# Patient Record
Sex: MALE | Race: WHITE | NOT HISPANIC OR LATINO | URBAN - METROPOLITAN AREA
[De-identification: names, ages, dates, MRNs, and addresses within clinical notes are randomized per-mention and may not be internally consistent; named-entity substitution may affect disease eponyms.]

---

## 2021-01-18 ENCOUNTER — OUTSIDE FACILITY SERVICE (OUTPATIENT)
Dept: CARDIOLOGY | Facility: CLINIC | Age: 36
End: 2021-01-18

## 2021-01-18 PROCEDURE — 93306 TTE W/DOPPLER COMPLETE: CPT | Performed by: INTERNAL MEDICINE

## 2023-05-15 ENCOUNTER — OFFICE VISIT (OUTPATIENT)
Dept: FAMILY MEDICINE CLINIC | Facility: CLINIC | Age: 38
End: 2023-05-15
Payer: COMMERCIAL

## 2023-05-15 VITALS
HEART RATE: 62 BPM | BODY MASS INDEX: 26.48 KG/M2 | WEIGHT: 178.8 LBS | HEIGHT: 69 IN | RESPIRATION RATE: 18 BRPM | TEMPERATURE: 97.1 F | DIASTOLIC BLOOD PRESSURE: 86 MMHG | SYSTOLIC BLOOD PRESSURE: 118 MMHG | OXYGEN SATURATION: 97 %

## 2023-05-15 DIAGNOSIS — R07.9 CHEST PAIN, UNSPECIFIED TYPE: ICD-10-CM

## 2023-05-15 DIAGNOSIS — Z76.89 ENCOUNTER TO ESTABLISH CARE: ICD-10-CM

## 2023-05-15 DIAGNOSIS — Z00.00 ANNUAL PHYSICAL EXAM: Primary | ICD-10-CM

## 2023-05-15 LAB
ALBUMIN SERPL-MCNC: 4.6 G/DL (ref 3.5–5.2)
ALBUMIN/GLOB SERPL: 1.6 G/DL
ALP SERPL-CCNC: 88 U/L (ref 39–117)
ALT SERPL W P-5'-P-CCNC: 18 U/L (ref 1–41)
ANION GAP SERPL CALCULATED.3IONS-SCNC: 12.8 MMOL/L (ref 5–15)
AST SERPL-CCNC: 24 U/L (ref 1–40)
BASOPHILS # BLD AUTO: 0.07 10*3/MM3 (ref 0–0.2)
BASOPHILS NFR BLD AUTO: 1.2 % (ref 0–1.5)
BILIRUB SERPL-MCNC: 0.4 MG/DL (ref 0–1.2)
BUN SERPL-MCNC: 15 MG/DL (ref 6–20)
BUN/CREAT SERPL: 16.5 (ref 7–25)
CALCIUM SPEC-SCNC: 9.8 MG/DL (ref 8.6–10.5)
CHLORIDE SERPL-SCNC: 104 MMOL/L (ref 98–107)
CHOLEST SERPL-MCNC: 173 MG/DL (ref 0–200)
CO2 SERPL-SCNC: 23.2 MMOL/L (ref 22–29)
CREAT SERPL-MCNC: 0.91 MG/DL (ref 0.76–1.27)
DEPRECATED RDW RBC AUTO: 40.6 FL (ref 37–54)
EGFRCR SERPLBLD CKD-EPI 2021: 111.3 ML/MIN/1.73
EOSINOPHIL # BLD AUTO: 0.32 10*3/MM3 (ref 0–0.4)
EOSINOPHIL NFR BLD AUTO: 5.3 % (ref 0.3–6.2)
ERYTHROCYTE [DISTWIDTH] IN BLOOD BY AUTOMATED COUNT: 11.9 % (ref 12.3–15.4)
GLOBULIN UR ELPH-MCNC: 2.8 GM/DL
GLUCOSE SERPL-MCNC: 97 MG/DL (ref 65–99)
HCT VFR BLD AUTO: 49.9 % (ref 37.5–51)
HCV AB SER DONR QL: NORMAL
HDLC SERPL-MCNC: 40 MG/DL (ref 40–60)
HGB BLD-MCNC: 16.9 G/DL (ref 13–17.7)
IMM GRANULOCYTES # BLD AUTO: 0.02 10*3/MM3 (ref 0–0.05)
IMM GRANULOCYTES NFR BLD AUTO: 0.3 % (ref 0–0.5)
LDLC SERPL CALC-MCNC: 119 MG/DL (ref 0–100)
LDLC/HDLC SERPL: 2.96 {RATIO}
LYMPHOCYTES # BLD AUTO: 1.77 10*3/MM3 (ref 0.7–3.1)
LYMPHOCYTES NFR BLD AUTO: 29.5 % (ref 19.6–45.3)
MCH RBC QN AUTO: 31.2 PG (ref 26.6–33)
MCHC RBC AUTO-ENTMCNC: 33.9 G/DL (ref 31.5–35.7)
MCV RBC AUTO: 92.1 FL (ref 79–97)
MONOCYTES # BLD AUTO: 0.37 10*3/MM3 (ref 0.1–0.9)
MONOCYTES NFR BLD AUTO: 6.2 % (ref 5–12)
NEUTROPHILS NFR BLD AUTO: 3.46 10*3/MM3 (ref 1.7–7)
NEUTROPHILS NFR BLD AUTO: 57.5 % (ref 42.7–76)
NRBC BLD AUTO-RTO: 0 /100 WBC (ref 0–0.2)
PLATELET # BLD AUTO: 297 10*3/MM3 (ref 140–450)
PMV BLD AUTO: 9.4 FL (ref 6–12)
POTASSIUM SERPL-SCNC: 4.4 MMOL/L (ref 3.5–5.2)
PROT SERPL-MCNC: 7.4 G/DL (ref 6–8.5)
RBC # BLD AUTO: 5.42 10*6/MM3 (ref 4.14–5.8)
SODIUM SERPL-SCNC: 140 MMOL/L (ref 136–145)
TRIGL SERPL-MCNC: 73 MG/DL (ref 0–150)
TSH SERPL DL<=0.05 MIU/L-ACNC: 2.1 UIU/ML (ref 0.27–4.2)
VLDLC SERPL-MCNC: 14 MG/DL (ref 5–40)
WBC NRBC COR # BLD: 6.01 10*3/MM3 (ref 3.4–10.8)

## 2023-05-15 PROCEDURE — 84443 ASSAY THYROID STIM HORMONE: CPT | Performed by: NURSE PRACTITIONER

## 2023-05-15 PROCEDURE — 86803 HEPATITIS C AB TEST: CPT | Performed by: NURSE PRACTITIONER

## 2023-05-15 PROCEDURE — 80061 LIPID PANEL: CPT | Performed by: NURSE PRACTITIONER

## 2023-05-15 PROCEDURE — 85025 COMPLETE CBC W/AUTO DIFF WBC: CPT | Performed by: NURSE PRACTITIONER

## 2023-05-15 PROCEDURE — 80053 COMPREHEN METABOLIC PANEL: CPT | Performed by: NURSE PRACTITIONER

## 2023-05-15 NOTE — PROGRESS NOTES
"Chief Complaint  Establish Care (Annual physical (continued care of chest pain).)    Subjective        Yung Montiel presents to [facility] to establish care (annual physical).    Chest Pain   This is a recurrent problem. Episode onset: couple years ago. The onset quality is sudden. The problem occurs intermittently. The problem has been waxing and waning. Pain location: left anterior chest area. The pain is at a severity of 2/10 (2 most of the time; up to a 10 with exertion). The quality of the pain is described as sharp. The pain radiates to the left arm. Associated symptoms include diaphoresis, dizziness, exertional chest pressure, headaches, malaise/fatigue, palpitations and shortness of breath. Pertinent negatives include no abdominal pain, back pain, claudication, cough, fever, hemoptysis, irregular heartbeat, leg pain, lower extremity edema, nausea, near-syncope, numbness, orthopnea, PND, sputum production, syncope, vomiting or weakness. Risk factors include smoking/tobacco exposure and male gender.   His family medical history is significant for hypertension and stroke.   Pertinent negatives for family medical history include: no diabetes and no hyperlipidemia. Prior workup: pt has been to ER 2 times; both times MI ruled out.     Objective   Vital Signs:  /86 (BP Location: Right arm, Patient Position: Sitting, Cuff Size: Adult)   Pulse 62   Temp 97.1 °F (36.2 °C) (Temporal)   Resp 18   Ht 175.3 cm (69\")   Wt 81.1 kg (178 lb 12.8 oz)   SpO2 97%   BMI 26.40 kg/m²   Estimated body mass index is 26.4 kg/m² as calculated from the following:    Height as of this encounter: 175.3 cm (69\").    Weight as of this encounter: 81.1 kg (178 lb 12.8 oz).       BMI is >= 25 and <30. (Overweight) The following options were offered after discussion;: weight loss educational material (shared in after visit summary)      Physical Exam  Vitals and nursing note reviewed. Exam " conducted with a chaperone present.   Constitutional:       General: He is awake.      Appearance: Normal appearance.   HENT:      Head: Normocephalic.      Right Ear: Hearing, tympanic membrane, ear canal and external ear normal.      Left Ear: Hearing, tympanic membrane, ear canal and external ear normal.      Nose: Nose normal.      Mouth/Throat:      Lips: Pink.      Mouth: Mucous membranes are moist.      Pharynx: Oropharynx is clear.   Eyes:      General: Lids are normal.      Extraocular Movements: Extraocular movements intact.      Conjunctiva/sclera: Conjunctivae normal.   Cardiovascular:      Rate and Rhythm: Normal rate and regular rhythm.      Pulses: Normal pulses.      Heart sounds: Normal heart sounds.   Pulmonary:      Effort: Pulmonary effort is normal.      Breath sounds: Normal breath sounds.   Abdominal:      General: Abdomen is protuberant. Bowel sounds are normal.      Palpations: Abdomen is soft.      Tenderness: There is no abdominal tenderness.   Musculoskeletal:      Cervical back: Normal range of motion and neck supple.      Right lower leg: No edema.      Left lower leg: No edema.   Skin:     General: Skin is warm and dry.      Capillary Refill: Capillary refill takes less than 2 seconds.   Neurological:      Mental Status: He is alert and oriented to person, place, and time.      Cranial Nerves: Cranial nerves 2-12 are intact.      Sensory: Sensation is intact.      Motor: Motor function is intact.      Coordination: Coordination is intact.      Gait: Gait is intact.      Deep Tendon Reflexes: Reflexes are normal and symmetric.   Psychiatric:         Attention and Perception: Attention and perception normal.         Mood and Affect: Affect normal. Mood is anxious.         Speech: Speech normal.         Behavior: Behavior normal. Behavior is cooperative.         Thought Content: Thought content normal.         Cognition and Memory: Cognition normal.         Judgment: Judgment normal.           Result Review :  The following data was reviewed by: SARITA Avila on 05/15/2023:                Pt just had CXR and EKG at [facility]. States all was negative. Pt refuses necessity to repeat these tests at this time.  Will request medical records.    Assessment and Plan   Diagnoses and all orders for this visit:    1. Annual physical exam (Primary)  -     Tdap Vaccine Greater Than or Equal To 6yo IM  -     CBC w AUTO Differential; Future  -     Comprehensive metabolic panel; Future  -     Lipid panel; Future  -     TSH; Future  -     Hepatitis C antibody; Future  -     Hepatitis C antibody  -     TSH  -     Lipid panel  -     Comprehensive metabolic panel  -     CBC w AUTO Differential    2. Encounter to establish care    3. Chest pain, unspecified type  -     Comprehensive metabolic panel; Future  -     Lipid panel; Future  -     Ambulatory Referral to Cardiology  -     Lipid panel  -     Comprehensive metabolic panel         The preventive exam has been reviewed in detail.  The patient has been fully counseled on preventative guidelines for vaccines, cancer screenings, and other health maintenance needs.   The patient has been counseled on guidelines for maintaining a lifestyle to promote good health and to minimize chronic diseases.  The patient has been assisted with scheduling these healthcare procedures for the coming year and given a written document of health maintenance and anticipatory guidance for age with the AVS.    I spent 30 minutes caring for Yung on this date of service. This time includes time spent by me in the following activities:preparing for the visit, reviewing tests, obtaining and/or reviewing a separately obtained history, performing a medically appropriate examination and/or evaluation , counseling and educating the patient/family/caregiver, ordering medications, tests, or procedures, referring and communicating with other health care professionals , documenting  information in the medical record, independently interpreting results and communicating that information with the patient/family/caregiver and care coordination     Follow Up   Return in about 1 year (around 5/15/2024) for Annual physical.  Patient was given instructions and counseling regarding his condition or for health maintenance advice. Please see specific information pulled into the AVS if appropriate.       This document has been electronically signed by SARITA Avila  May 15, 2023 10:55 EDT

## 2023-05-16 NOTE — PROGRESS NOTES
Results discussed with pt. Pt educated regarding AHA lifestyle recommendations to improve fasting lipid levels. Pt verbalizes understanding.

## 2023-05-19 ENCOUNTER — PATIENT ROUNDING (BHMG ONLY) (OUTPATIENT)
Dept: FAMILY MEDICINE CLINIC | Facility: CLINIC | Age: 38
End: 2023-05-19
Payer: COMMERCIAL

## 2023-05-19 NOTE — PROGRESS NOTES
May 19, 2023    Hello, may I speak with Yung Montiel?    My name is Jaya Barragan    I am  with [facility]  [address] [address] [phone].    Before we get started may I verify your date of birth? 1985    I am calling to officially welcome you to our practice and ask about your recent visit. Is this a good time to talk?     YES    Tell me about your visit with us. What things went well?      Patient said his wait time was between 10 and 15 minutes. He said he didn't have any issues with the staff and this visit went better than some visits he has had at other offices.        We're always looking for ways to make our patients' experiences even better. Do you have recommendations on ways we may improve?      No, the visit was excellent.    Overall were you satisfied with your first visit to our practice?     YES       I appreciate you taking the time to speak with me today. Is there anything else I can do for you?     NO      Thank you, and have a great day.

## 2023-05-25 ENCOUNTER — OFFICE VISIT (OUTPATIENT)
Dept: CARDIOLOGY | Facility: CLINIC | Age: 38
End: 2023-05-25
Payer: COMMERCIAL

## 2023-05-25 VITALS
HEIGHT: 69 IN | DIASTOLIC BLOOD PRESSURE: 100 MMHG | BODY MASS INDEX: 26.66 KG/M2 | SYSTOLIC BLOOD PRESSURE: 140 MMHG | HEART RATE: 77 BPM | WEIGHT: 180 LBS

## 2023-05-25 DIAGNOSIS — E78.00 HYPERCHOLESTEREMIA: ICD-10-CM

## 2023-05-25 DIAGNOSIS — R07.2 PRECORDIAL PAIN: Primary | ICD-10-CM

## 2023-05-25 DIAGNOSIS — R00.2 PALPITATIONS: ICD-10-CM

## 2023-05-25 DIAGNOSIS — Z72.0 TOBACCO USE: ICD-10-CM

## 2023-05-25 DIAGNOSIS — I10 PRIMARY HYPERTENSION: ICD-10-CM

## 2023-05-25 DIAGNOSIS — R06.02 SHORTNESS OF BREATH: ICD-10-CM

## 2023-05-25 PROCEDURE — 3080F DIAST BP >= 90 MM HG: CPT | Performed by: INTERNAL MEDICINE

## 2023-05-25 PROCEDURE — 99204 OFFICE O/P NEW MOD 45 MIN: CPT | Performed by: INTERNAL MEDICINE

## 2023-05-25 PROCEDURE — 93000 ELECTROCARDIOGRAM COMPLETE: CPT | Performed by: INTERNAL MEDICINE

## 2023-05-25 PROCEDURE — 3077F SYST BP >= 140 MM HG: CPT | Performed by: INTERNAL MEDICINE

## 2023-05-25 PROCEDURE — 1160F RVW MEDS BY RX/DR IN RCRD: CPT | Performed by: INTERNAL MEDICINE

## 2023-05-25 PROCEDURE — 1159F MED LIST DOCD IN RCRD: CPT | Performed by: INTERNAL MEDICINE

## 2023-05-25 RX ORDER — METOPROLOL SUCCINATE 25 MG/1
25 TABLET, EXTENDED RELEASE ORAL DAILY
Qty: 30 TABLET | Refills: 11 | Status: SHIPPED | OUTPATIENT
Start: 2023-05-25

## 2023-05-25 RX ORDER — ROSUVASTATIN CALCIUM 10 MG/1
10 TABLET, COATED ORAL DAILY
Qty: 30 TABLET | Refills: 11 | Status: SHIPPED | OUTPATIENT
Start: 2023-05-25

## 2023-05-25 RX ORDER — PANTOPRAZOLE SODIUM 40 MG/1
40 TABLET, DELAYED RELEASE ORAL DAILY
Qty: 30 TABLET | Refills: 6 | Status: SHIPPED | OUTPATIENT
Start: 2023-05-25

## 2023-05-25 NOTE — PROGRESS NOTES
Yung Montiel  reports that he has never smoked. His smokeless tobacco use includes snuff.. I have educated him on the risk of diseases from using tobacco products such as cancer and heart disease.     I advised him to quit and he is willing to quit. We have discussed the following method/s for tobacco cessation:  Education Material Counseling.  Together we have set a quit date for 1 week from today.  He will follow up with me in 6 months or sooner to check on his progress.    I spent 3  minutes counseling the patient.

## 2023-05-25 NOTE — LETTER
May 25, 2023       No Recipients    Patient: Yung Montiel   YOB: 1985   Date of Visit: 5/25/2023       Dear Dr. Avelar Recipients:    Thank you for referring Yung Montiel to me for evaluation. Below are the relevant portions of my assessment and plan of care.    If you have questions, please do not hesitate to call me. I look forward to following Yung along with you.         Sincerely,        Felisa Gee MD        CC:   No Recipients    Felisa Gee MD  05/25/23 1147  Sign when Signing Visit  Yung Montiel  reports that he has never smoked. His smokeless tobacco use includes snuff.. I have educated him on the risk of diseases from using tobacco products such as cancer and heart disease.     I advised him to quit and he is willing to quit. We have discussed the following method/s for tobacco cessation:  Education Material Counseling.  Together we have set a quit date for 1 week from today.  He will follow up with me in 6 months or sooner to check on his progress.    I spent 3  minutes counseling the patient.            Felisa Gee MD  05/25/23 1147  Sign when Signing Visit  Chief Complaint   Patient presents with   • Establish Care     PCP referred, chest pain   • Chest Pain     Started at least 2 years ago, will occur with exertion, when under a lot of stress and with sex. Mid chest tightness/pressure, radiates to left shoulder, down left arm, left arm will become numb at times. SOB noted. Will last from 5 minutes up to at least 15 minutes. Symp resolve after resting. Went to the ER at [facility] as well as [facility] a couple years ago as well as about a month ago. Requesting records.    • LABS     Recent labs in chart.    • Cardiac history     none   • Shortness of Breath     With exertion. Worse than it was.         CARDIAC COMPLAINTS  chest pressure/discomfort and dyspnea     Subjective   Yung Montiel is a 38 y.o. male came in today for his initial cardiac  evaluation.  He has no previously diagnosed hypertension though he had documented elevated blood pressure when he was seen in the emergency room.  He has been having chest pain on and off for the last few years.  He has been to the emergency room at this hospital as well as at the [facility].  His blood pressure was elevated during that time.  The symptoms do get better after being treated with some IV fluids and aspirin and sometimes nonsteroidal anti-inflammatory medications.  Apparently his blood pressure also does come down.  The chest pain is mostly a tightness or a pressure feeling in the mid part of the chest radiating down to the left shoulder and left arm.  It occurs with exertion and also after eating any spicy food.  It is not associated with any nausea or vomiting.  Patient also complained of having shortness of breath which occurs mostly on exertion.  It occurs especially when he is walking uphill.  The symptoms last for about 5 to 15 minutes and does get better after resting.  He did undergo an echocardiogram few years ago and found to have essentially normal LV function.  He also underwent lab work and found to have normal blood count, normal renal function and liver function.  His cholesterol is 173 but the LDL was 119.  His TSH was normal and his hepatitis C antibodies was negative.  He has no history of smoking but dips tobacco.  He does have family history of hypertension.  His grandfather did have a stroke as well as ischemic heart disease    Past Surgical History:   Procedure Laterality Date   • ECHO - CONVERTED  01/18/2021    @ [facility]. EF > 55%.Trace MR       Current Outpatient Medications   Medication Sig Dispense Refill   • metoprolol succinate XL (TOPROL-XL) 25 MG 24 hr tablet Take 1 tablet by mouth Daily. 30 tablet 11   • pantoprazole (Protonix) 40 MG EC tablet Take 1 tablet by mouth Daily. 30 tablet 6   • rosuvastatin (CRESTOR) 10 MG tablet Take 1 tablet by mouth Daily. 30  "tablet 11     No current facility-administered medications for this visit.          ALLERGIES:  Patient has no known allergies.    Past Medical History:   Diagnosis Date   • Migraines    • Seasonal allergies        Social History     Tobacco Use   Smoking Status Never   Smokeless Tobacco Current   • Types: Snuff          Family History   Problem Relation Age of Onset   • Lung cancer Mother    • Other Mother         tachycardia   • Leukemia Mother    • Lung cancer Father    • Melanoma Father    • Thyroid disease Sister    • Other Maternal Grandmother          at a young age from complications from paralysis   • Other Maternal Grandfather          from natural causes   • Other Paternal Grandmother         medical history unknown   • Heart attack Paternal Grandfather    • Stroke Paternal Grandfather        Review of Systems   Constitutional: Negative for decreased appetite and malaise/fatigue.   HENT: Negative for congestion and sore throat.    Eyes: Negative for blurred vision, double vision and visual disturbance.   Cardiovascular: Positive for chest pain, dyspnea on exertion and palpitations.   Respiratory: Positive for shortness of breath. Negative for snoring.    Endocrine: Negative for cold intolerance and heat intolerance.   Hematologic/Lymphatic: Negative for adenopathy. Does not bruise/bleed easily.   Skin: Negative for itching, nail changes and skin cancer.   Musculoskeletal: Negative for arthritis and myalgias.   Gastrointestinal: Negative for abdominal pain, dysphagia and heartburn.   Genitourinary: Negative for bladder incontinence and frequency.   Neurological: Negative for dizziness, seizures and vertigo.   Psychiatric/Behavioral: Negative for altered mental status.   Allergic/Immunologic: Negative for environmental allergies and hives.       Diabetes- No  Thyroid- normal    Objective     /100 (BP Location: Left arm)   Pulse 77   Ht 175.3 cm (69\")   Wt 81.6 kg (180 lb)   BMI 26.58 kg/m² "     Vitals and nursing note reviewed.   Constitutional:       Appearance: Healthy appearance. Not in distress.   Eyes:      Conjunctiva/sclera: Conjunctivae normal.      Pupils: Pupils are equal, round, and reactive to light.   HENT:      Head: Normocephalic.   Pulmonary:      Effort: Pulmonary effort is normal.      Breath sounds: Normal breath sounds.   Cardiovascular:      PMI at left midclavicular line. Normal rate. Regular rhythm.      Murmurs: There is a grade 3/6 high frequency blowing holosystolic murmur at the apex.   Abdominal:      General: Bowel sounds are normal.      Palpations: Abdomen is soft.   Musculoskeletal: Normal range of motion.      Cervical back: Normal range of motion and neck supple. Skin:     General: Skin is warm and dry.   Neurological:      Mental Status: Alert, oriented to person, place, and time and oriented to person, place and time.           ECG 12 Lead    Date/Time: 5/25/2023 11:46 AM  Performed by: Felisa Gee MD  Authorized by: Felisa Gee MD   Comparison: compared with previous ECG from 1/16/2021  Similar to previous ECG  Rhythm: sinus rhythm  Rate: normal  QRS axis: normal    Clinical impression: normal ECG             @ASSESSMENT/PLAN@  BMI is >= 25 and <30. (Overweight) The following options were offered after discussion;: exercise counseling/recommendations and nutrition counseling/recommendations     Diagnoses and all orders for this visit:    1. Precordial pain (Primary)  -     Treadmill Stress Test; Future  -     pantoprazole (Protonix) 40 MG EC tablet; Take 1 tablet by mouth Daily.  Dispense: 30 tablet; Refill: 6    2. Shortness of breath  -     Adult Transthoracic Echo Complete W/ Cont if Necessary Per Protocol; Future    3. Palpitations  -     metoprolol succinate XL (TOPROL-XL) 25 MG 24 hr tablet; Take 1 tablet by mouth Daily.  Dispense: 30 tablet; Refill: 11    4. Primary hypertension  -     metoprolol succinate XL (TOPROL-XL) 25 MG 24 hr tablet;  Take 1 tablet by mouth Daily.  Dispense: 30 tablet; Refill: 11    5. Tobacco use    6. Hypercholesteremia  -     rosuvastatin (CRESTOR) 10 MG tablet; Take 1 tablet by mouth Daily.  Dispense: 30 tablet; Refill: 11       At baseline his heart rate is within normal limit.  His blood pressure is elevated.  His EKG shows normal sinus rhythm, no LVH, no significant ST-T changes.  His clinical examination reveals a BMI of 27.  His cardiovascular examination is unremarkable other than short systolic murmur at the mitral area    Regarding the chest pain, he does have few risk factors for coronary artery disease in some part of his symptoms seems to be anginal but also has a GI component especially with symptoms gets worse with spicy food.  At this time I started him on Protonix at 40 mg once a day.  I did schedule him to undergo a stress test to evaluate his functional status, chronotropic response, blood pressure response and to rule out any stress-induced ischemia or arrhythmia    Regarding his hypertension, it is elevated and he also complain of having palpitation.  I started him on Toprol-XL at 25 mg once a day.  Based on the stress test the dose may need to be increased or add addition of ACE inhibitor    Regarding the shortness of breath, I advised him to repeat his echocardiogram to look for LVH, LV function, valvular structures and PA pressure    Regarding his history of tobacco use, I had a long talk with him about it.  I explained to him about the increased risk.  I gave him papers to help him quit chewing tobacco.    Regarding his hypercholesterolemia, given his family history I like to keep the LDL less than 70.  I started him on Crestor at 10 mg once a day    Regarding his metabolic syndrome, I talked him about cutting down on the carbohydrate intake    Based on the results, further recommendations will be made.            Electronically signed by Felisa Gee MD May 25, 2023 11:40 EDT

## 2023-05-25 NOTE — PROGRESS NOTES
Chief Complaint   Patient presents with   • Establish Care     PCP referred, chest pain   • Chest Pain     Started at least 2 years ago, will occur with exertion, when under a lot of stress and with sex. Mid chest tightness/pressure, radiates to left shoulder, down left arm, left arm will become numb at times. SOB noted. Will last from 5 minutes up to at least 15 minutes. Symp resolve after resting. Went to the ER at [facility] as well as [facility] a couple years ago as well as about a month ago. Requesting records.    • LABS     Recent labs in chart.    • Cardiac history     none   • Shortness of Breath     With exertion. Worse than it was.         CARDIAC COMPLAINTS  chest pressure/discomfort and dyspnea      Subjective   Yung Montiel is a 38 y.o. male came in today for his initial cardiac evaluation.  He has no previously diagnosed hypertension though he had documented elevated blood pressure when he was seen in the emergency room.  He has been having chest pain on and off for the last few years.  He has been to the emergency room at this hospital as well as at the [facility].  His blood pressure was elevated during that time.  The symptoms do get better after being treated with some IV fluids and aspirin and sometimes nonsteroidal anti-inflammatory medications.  Apparently his blood pressure also does come down.  The chest pain is mostly a tightness or a pressure feeling in the mid part of the chest radiating down to the left shoulder and left arm.  It occurs with exertion and also after eating any spicy food.  It is not associated with any nausea or vomiting.  Patient also complained of having shortness of breath which occurs mostly on exertion.  It occurs especially when he is walking uphill.  The symptoms last for about 5 to 15 minutes and does get better after resting.  He did undergo an echocardiogram few years ago and found to have essentially normal LV function.  He also underwent lab work and  found to have normal blood count, normal renal function and liver function.  His cholesterol is 173 but the LDL was 119.  His TSH was normal and his hepatitis C antibodies was negative.  He has no history of smoking but dips tobacco.  He does have family history of hypertension.  His grandfather did have a stroke as well as ischemic heart disease    Past Surgical History:   Procedure Laterality Date   • ECHO - CONVERTED  2021    @ [facility]. EF > 55%.Trace MR       Current Outpatient Medications   Medication Sig Dispense Refill   • metoprolol succinate XL (TOPROL-XL) 25 MG 24 hr tablet Take 1 tablet by mouth Daily. 30 tablet 11   • pantoprazole (Protonix) 40 MG EC tablet Take 1 tablet by mouth Daily. 30 tablet 6   • rosuvastatin (CRESTOR) 10 MG tablet Take 1 tablet by mouth Daily. 30 tablet 11     No current facility-administered medications for this visit.           ALLERGIES:  Patient has no known allergies.    Past Medical History:   Diagnosis Date   • Migraines    • Seasonal allergies        Social History     Tobacco Use   Smoking Status Never   Smokeless Tobacco Current   • Types: Snuff          Family History   Problem Relation Age of Onset   • Lung cancer Mother    • Other Mother         tachycardia   • Leukemia Mother    • Lung cancer Father    • Melanoma Father    • Thyroid disease Sister    • Other Maternal Grandmother          at a young age from complications from paralysis   • Other Maternal Grandfather          from natural causes   • Other Paternal Grandmother         medical history unknown   • Heart attack Paternal Grandfather    • Stroke Paternal Grandfather        Review of Systems   Constitutional: Negative for decreased appetite and malaise/fatigue.   HENT: Negative for congestion and sore throat.    Eyes: Negative for blurred vision, double vision and visual disturbance.   Cardiovascular: Positive for chest pain, dyspnea on exertion and palpitations.   Respiratory: Positive for  "shortness of breath. Negative for snoring.    Endocrine: Negative for cold intolerance and heat intolerance.   Hematologic/Lymphatic: Negative for adenopathy. Does not bruise/bleed easily.   Skin: Negative for itching, nail changes and skin cancer.   Musculoskeletal: Negative for arthritis and myalgias.   Gastrointestinal: Negative for abdominal pain, dysphagia and heartburn.   Genitourinary: Negative for bladder incontinence and frequency.   Neurological: Negative for dizziness, seizures and vertigo.   Psychiatric/Behavioral: Negative for altered mental status.   Allergic/Immunologic: Negative for environmental allergies and hives.       Diabetes- No  Thyroid- normal    Objective     /100 (BP Location: Left arm)   Pulse 77   Ht 175.3 cm (69\")   Wt 81.6 kg (180 lb)   BMI 26.58 kg/m²     Vitals and nursing note reviewed.   Constitutional:       Appearance: Healthy appearance. Not in distress.   Eyes:      Conjunctiva/sclera: Conjunctivae normal.      Pupils: Pupils are equal, round, and reactive to light.   HENT:      Head: Normocephalic.   Pulmonary:      Effort: Pulmonary effort is normal.      Breath sounds: Normal breath sounds.   Cardiovascular:      PMI at left midclavicular line. Normal rate. Regular rhythm.      Murmurs: There is a grade 3/6 high frequency blowing holosystolic murmur at the apex.   Abdominal:      General: Bowel sounds are normal.      Palpations: Abdomen is soft.   Musculoskeletal: Normal range of motion.      Cervical back: Normal range of motion and neck supple. Skin:     General: Skin is warm and dry.   Neurological:      Mental Status: Alert, oriented to person, place, and time and oriented to person, place and time.           ECG 12 Lead    Date/Time: 5/25/2023 11:46 AM  Performed by: Felisa Gee MD  Authorized by: Felisa Gee MD   Comparison: compared with previous ECG from 1/16/2021  Similar to previous ECG  Rhythm: sinus rhythm  Rate: normal  QRS axis: " normal    Clinical impression: normal ECG              @ASSESSMENT/PLAN@  BMI is >= 25 and <30. (Overweight) The following options were offered after discussion;: exercise counseling/recommendations and nutrition counseling/recommendations     Diagnoses and all orders for this visit:    1. Precordial pain (Primary)  -     Treadmill Stress Test; Future  -     pantoprazole (Protonix) 40 MG EC tablet; Take 1 tablet by mouth Daily.  Dispense: 30 tablet; Refill: 6    2. Shortness of breath  -     Adult Transthoracic Echo Complete W/ Cont if Necessary Per Protocol; Future    3. Palpitations  -     metoprolol succinate XL (TOPROL-XL) 25 MG 24 hr tablet; Take 1 tablet by mouth Daily.  Dispense: 30 tablet; Refill: 11    4. Primary hypertension  -     metoprolol succinate XL (TOPROL-XL) 25 MG 24 hr tablet; Take 1 tablet by mouth Daily.  Dispense: 30 tablet; Refill: 11    5. Tobacco use    6. Hypercholesteremia  -     rosuvastatin (CRESTOR) 10 MG tablet; Take 1 tablet by mouth Daily.  Dispense: 30 tablet; Refill: 11       At baseline his heart rate is within normal limit.  His blood pressure is elevated.  His EKG shows normal sinus rhythm, no LVH, no significant ST-T changes.  His clinical examination reveals a BMI of 27.  His cardiovascular examination is unremarkable other than short systolic murmur at the mitral area    Regarding the chest pain, he does have few risk factors for coronary artery disease in some part of his symptoms seems to be anginal but also has a GI component especially with symptoms gets worse with spicy food.  At this time I started him on Protonix at 40 mg once a day.  I did schedule him to undergo a stress test to evaluate his functional status, chronotropic response, blood pressure response and to rule out any stress-induced ischemia or arrhythmia    Regarding his hypertension, it is elevated and he also complain of having palpitation.  I started him on Toprol-XL at 25 mg once a day.  Based on the  stress test the dose may need to be increased or add addition of ACE inhibitor    Regarding the shortness of breath, I advised him to repeat his echocardiogram to look for LVH, LV function, valvular structures and PA pressure    Regarding his history of tobacco use, I had a long talk with him about it.  I explained to him about the increased risk.  I gave him papers to help him quit chewing tobacco.    Regarding his hypercholesterolemia, given his family history I like to keep the LDL less than 70.  I started him on Crestor at 10 mg once a day    Regarding his metabolic syndrome, I talked him about cutting down on the carbohydrate intake    Based on the results, further recommendations will be made.             Electronically signed by Felisa Gee MD May 25, 2023 11:40 EDT

## 2023-06-05 ENCOUNTER — HOSPITAL ENCOUNTER (OUTPATIENT)
Dept: CARDIOLOGY | Facility: HOSPITAL | Age: 38
Discharge: HOME | End: 2023-06-05
Payer: COMMERCIAL

## 2023-06-05 DIAGNOSIS — R06.02 SHORTNESS OF BREATH: ICD-10-CM

## 2023-06-05 DIAGNOSIS — R07.2 PRECORDIAL PAIN: ICD-10-CM

## 2023-06-05 LAB
BH CV ECHO MEAS - ACS: 2.46 CM
BH CV ECHO MEAS - AO MAX PG: 4.5 MMHG
BH CV ECHO MEAS - AO MEAN PG: 2.6 MMHG
BH CV ECHO MEAS - AO ROOT DIAM: 3.4 CM
BH CV ECHO MEAS - AO V2 MAX: 105.9 CM/SEC
BH CV ECHO MEAS - AO V2 VTI: 20.9 CM
BH CV ECHO MEAS - EDV(CUBED): 103.8 ML
BH CV ECHO MEAS - EDV(MOD-SP4): 94.9 ML
BH CV ECHO MEAS - EF(MOD-SP4): 57.4 %
BH CV ECHO MEAS - EF_3D-VOL: 60 %
BH CV ECHO MEAS - ESV(CUBED): 18.4 ML
BH CV ECHO MEAS - ESV(MOD-SP4): 40.4 ML
BH CV ECHO MEAS - FS: 43.8 %
BH CV ECHO MEAS - IVS/LVPW: 1.02 CM
BH CV ECHO MEAS - IVSD: 1.13 CM
BH CV ECHO MEAS - LA DIMENSION: 3.4 CM
BH CV ECHO MEAS - LAT PEAK E' VEL: 8.6 CM/SEC
BH CV ECHO MEAS - LV DIASTOLIC VOL/BSA (35-75): 48 CM2
BH CV ECHO MEAS - LV MASS(C)D: 192.3 GRAMS
BH CV ECHO MEAS - LV SYSTOLIC VOL/BSA (12-30): 20.5 CM2
BH CV ECHO MEAS - LVIDD: 4.7 CM
BH CV ECHO MEAS - LVIDS: 2.6 CM
BH CV ECHO MEAS - LVPWD: 1.11 CM
BH CV ECHO MEAS - MED PEAK E' VEL: 6.9 CM/SEC
BH CV ECHO MEAS - MV A MAX VEL: 67.7 CM/SEC
BH CV ECHO MEAS - MV DEC SLOPE: 286.2 CM/SEC2
BH CV ECHO MEAS - MV E MAX VEL: 54.8 CM/SEC
BH CV ECHO MEAS - MV E/A: 0.81
BH CV ECHO MEAS - RVDD: 2.4 CM
BH CV ECHO MEAS - SI(MOD-SP4): 27.6 ML/M2
BH CV ECHO MEAS - SV(MOD-SP4): 54.5 ML
BH CV ECHO MEASUREMENTS AVERAGE E/E' RATIO: 7.07
BH CV STRESS RECOVERY BP: NORMAL MMHG
BH CV STRESS RECOVERY HR: 110 BPM
LEFT ATRIUM VOLUME INDEX: 15.5 ML/M2
MAXIMAL PREDICTED HEART RATE: 183 BPM
PERCENT MAX PREDICTED HR: 96.17 %
STRESS BASELINE BP: NORMAL MMHG
STRESS BASELINE HR: 84 BPM
STRESS PERCENT HR: 113 %
STRESS POST ESTIMATED WORKLOAD: 13.6 METS
STRESS POST EXERCISE DUR MIN: 12 MIN
STRESS POST EXERCISE DUR SEC: 4 SEC
STRESS POST PEAK BP: NORMAL MMHG
STRESS POST PEAK HR: 176 BPM
STRESS TARGET HR: 156 BPM

## 2023-06-05 PROCEDURE — 93017 CV STRESS TEST TRACING ONLY: CPT

## 2023-06-05 PROCEDURE — 93306 TTE W/DOPPLER COMPLETE: CPT

## 2023-06-05 PROCEDURE — 93306 TTE W/DOPPLER COMPLETE: CPT | Performed by: INTERNAL MEDICINE

## 2024-06-12 DIAGNOSIS — R00.2 PALPITATIONS: ICD-10-CM

## 2024-06-12 DIAGNOSIS — I10 PRIMARY HYPERTENSION: ICD-10-CM

## 2024-06-12 DIAGNOSIS — R07.2 PRECORDIAL PAIN: ICD-10-CM

## 2024-06-12 DIAGNOSIS — E78.00 HYPERCHOLESTEREMIA: ICD-10-CM

## 2024-06-12 RX ORDER — PANTOPRAZOLE SODIUM 40 MG/1
40 TABLET, DELAYED RELEASE ORAL DAILY
Qty: 30 TABLET | Refills: 5 | OUTPATIENT
Start: 2024-06-12

## 2024-06-12 RX ORDER — ROSUVASTATIN CALCIUM 10 MG/1
10 TABLET, COATED ORAL DAILY
Qty: 30 TABLET | Refills: 10 | OUTPATIENT
Start: 2024-06-12

## 2024-06-12 RX ORDER — METOPROLOL SUCCINATE 25 MG/1
25 TABLET, EXTENDED RELEASE ORAL DAILY
Qty: 30 TABLET | Refills: 10 | OUTPATIENT
Start: 2024-06-12